# Patient Record
Sex: MALE | Race: WHITE | NOT HISPANIC OR LATINO | Employment: FULL TIME | URBAN - METROPOLITAN AREA
[De-identification: names, ages, dates, MRNs, and addresses within clinical notes are randomized per-mention and may not be internally consistent; named-entity substitution may affect disease eponyms.]

---

## 2017-01-05 ENCOUNTER — OFFICE VISIT (OUTPATIENT)
Dept: WOUND CARE | Facility: HOSPITAL | Age: 67
End: 2017-01-05
Attending: SURGERY
Payer: MEDICARE

## 2017-01-05 VITALS
SYSTOLIC BLOOD PRESSURE: 173 MMHG | RESPIRATION RATE: 20 BRPM | DIASTOLIC BLOOD PRESSURE: 84 MMHG | TEMPERATURE: 98 F | HEART RATE: 63 BPM

## 2017-01-05 DIAGNOSIS — L97.522 NEUROPATHIC ULCER OF TOE OF LEFT FOOT WITH FAT LAYER EXPOSED: ICD-10-CM

## 2017-01-05 PROCEDURE — 99212 OFFICE O/P EST SF 10 MIN: CPT | Mod: 25

## 2017-01-05 PROCEDURE — 11042 DBRDMT SUBQ TIS 1ST 20SQCM/<: CPT

## 2017-01-05 PROCEDURE — 27201912 HC WOUND CARE DEBRIDEMENT SUPPLIES

## 2017-01-05 NOTE — MR AVS SNAPSHOT
[facility] [address] [place] [address]  Phone: [phone]  Fax: [phone]                  Michael Plummer   2017 8:30 AM   Appointment    Description:  Male : 1950   Provider:  Fuentes Infante Jr., MD   Department:  [facility]                To Do List           Future Appointments        Provider Department Dept Phone    2017 2:20 PM COORDINATED DEVICE CHECK [address] - Arrhythmia [phone]      Goals (5 Years of Data)     None      [facility] [place] [facility]     [facility] -  Assistance  Registered nurses in the [facility] provide clinical advisement, health education, appointment booking, and other advisory services.  Call for this free service at [phone].             Medications           Message regarding Medications     Verify the changes and/or additions to your medication regime listed below are the same as discussed with your clinician today.  If any of these changes or additions are incorrect, please notify your healthcare provider.             Verify that the below list of medications is an accurate representation of the medications you are currently taking.  If none reported, the list may be blank. If incorrect, please contact your healthcare provider. Carry this list with you in case of emergency.           Current Medications     acetaminophen (TYLENOL) 500 MG tablet Take 500 mg by mouth 2 (two) times daily.      amlodipine (NORVASC) 5 MG tablet Take 5 mg by mouth once daily.      ascorbic acid (VITAMIN C) 500 MG tablet Take 500 mg by mouth once daily.     aspirin (ECOTRIN) 81 MG EC tablet Take 81 mg by mouth once daily.    atorvastatin (LIPITOR) 40 MG tablet Take 80 mg by mouth every evening.     bumetanide (BUMEX) 1 MG tablet Take 1 mg by mouth once daily.     cholecalciferol, vitamin D3, (VITAMIN D3) 1,000 unit capsule Take 1,000 Units by mouth once daily.      digoxin (LANOXIN) 125  mcg tablet Take 125 mcg by mouth once daily.      escitalopram (LEXAPRO) 10 MG tablet Take 10 mg by mouth once daily.    fluticasone (FLONASE) 50 mcg/actuation nasal spray 1 spray by Nasal route once daily.      GLUCOSAMINE HCL/CHONDR MONAE A NA (OSTEO BI-FLEX ORAL) Take 1 tablet by mouth 2 (two) times daily.      iron, carbonyl, (ICAR) 15 mg/1.25 mL Susp Take by mouth once daily. 45mg tablets     isosorbide mononitrate (IMDUR) 30 MG 24 hr tablet Take 30 mg by mouth once daily.    lisinopril (PRINIVIL,ZESTRIL) 20 MG tablet Take 20 mg by mouth once daily.      meloxicam (MOBIC) 15 MG tablet Take 15 mg by mouth once daily.      metoprolol succinate (TOPROL-XL) 50 MG 24 hr tablet Take 50 mg by mouth once daily.      multivitamin (MULTIVITAMIN) per tablet Take 1 tablet by mouth once daily.      niacin (SLO-NIACIN) 500 mg tablet     niacin 500 MG Tab Take 500 mg by mouth once daily. TWO TABS IN THE MORNING AND ONE TABLET AT NIGHT    nitroGLYCERIN (NITROSTAT) 0.4 MG SL tablet Place 1 tablet (0.4 mg total) under the tongue every 5 (five) minutes as needed for Chest pain.    omega-3 acid ethyl esters (LOVAZA) 1 gram capsule Take 2 g by mouth 2 (two) times daily.      oxycodone-acetaminophen  mg (PERCOCET)  mg per tablet     pantoprazole (PROTONIX) 40 MG tablet Take 40 mg by mouth once daily.    sotalol (BETAPACE) 80 MG tablet Take 1 tablet (80 mg total) by mouth 2 (two) times daily.    tamsulosin (FLOMAX) 0.4 mg Cp24     tolterodine (DETROL LA) 4 MG 24 hr capsule Take 4 mg by mouth once daily.    tramadol (ULTRAM) 50 mg tablet Take 50 mg by mouth every 6 (six) hours as needed.      valacyclovir (VALTREX) 1000 MG tablet Take 1,000 mg by mouth once daily.     VASCEPA 1 gram Cap            Clinical Reference Information           Allergies as of 1/5/2017     Adhesive    Bactrim [Sulfamethoxazole-trimethoprim]      Immunizations Administered on Date of Encounter - 1/5/2017     None

## 2017-01-11 ENCOUNTER — CLINICAL SUPPORT (OUTPATIENT)
Dept: ELECTROPHYSIOLOGY | Facility: CLINIC | Age: 67
End: 2017-01-11
Payer: MEDICARE

## 2017-01-11 DIAGNOSIS — I47.20 VT (VENTRICULAR TACHYCARDIA): ICD-10-CM

## 2017-01-11 DIAGNOSIS — Z95.810 AUTOMATIC IMPLANTABLE CARDIAC DEFIBRILLATOR IN SITU: ICD-10-CM

## 2017-01-11 PROCEDURE — 93284 PRGRMG EVAL IMPLANTABLE DFB: CPT | Mod: PBBFAC | Performed by: INTERNAL MEDICINE

## 2017-01-12 NOTE — PROGRESS NOTES
DFT/NIEPS EDUCATION CHECKLIST      PRE - PROCEDURE LABS HAVE BEEN ORDERED FOR YOU @   [facility] ([phone]) to schedule appointment on 3/17/17 to have labs drawn and results faxed to us at [phone].  BE SURE TO ARRIVE AT YOUR SCHEDULED TIME FOR THIS LAB WORK!  (YOU DO NOT HAVE TO FAST FOR THIS LABWORK!!!!)    3/23/17/17 @ 6 AM -  ARRIVAL TIME FOR PROCEDURE -  REPORT TO CARDIOLOGY WAITING ROOM ON 3RD FLOOR OF HOSPITAL (DO NOT REPORT TO CLINIC)  Directions for Reporting to Cardiology Waiting Area in the Hospital  If you park in the Parking Garage:  Take elevators to the 2nd floor  Walk up ramp and turn right by Gold Elevators  Take elevator to the 3rd floor  Upon exiting the elevator, turn away from the clinic areas  Walk long sarabia around to front of hospital to area with windows overlooking [facility]  Check in at Reception Desk  OR  If family is dropping you off:  Have them drop you off at the front of the Hospital  (Near the ER, where all the flags are hung).  Take the E elevators to the 3rd floor.  Check in at the Reception Desk in the waiting room.      DO NOT EAT OR DRINK ANYTHING AFTER: Midnight on 3/22/17    MEDICATIONS:  YOU MAY TAKE YOUR USUAL MORNING MEDICATIONS WITH A SIP OF WATER    YOU WILL BE GOING HOME AFTER YOUR PROCEDURE  YOU WILL NEED SOMEONE TO DRIVE YOU HOME AFTER YOUR PROCEDURE     YOUR PAIN DURING YOUR PROCEDURE WILL BE MANAGED BY THE ANESTHESIA TEAM    THE ABOVE INSTRUCTIONS WERE GIVEN TO THE PATIENT VERBALLY AND THEY VERBALIZED UNDERSTANDING. THEY DO NOT REQUIRE ANY SPECIAL NEEDS AND DO NOT HAVE ANY LEARNING BARRIERS.    Any need to reschedule or cancel procedures, or any questions regarding your procedures should be addressed directly with the Arrhythmia Department Nurses at the following phone number: [phone]

## 2017-01-19 ENCOUNTER — OFFICE VISIT (OUTPATIENT)
Dept: WOUND CARE | Facility: HOSPITAL | Age: 67
End: 2017-01-19
Attending: SURGERY
Payer: MEDICARE

## 2017-01-19 DIAGNOSIS — L97.522 NEUROPATHIC ULCER OF TOE OF LEFT FOOT WITH FAT LAYER EXPOSED: Primary | ICD-10-CM

## 2017-01-26 ENCOUNTER — TELEPHONE (OUTPATIENT)
Dept: ELECTROPHYSIOLOGY | Facility: CLINIC | Age: 67
End: 2017-01-26

## 2017-01-26 NOTE — TELEPHONE ENCOUNTER
Called patient to confirm EP Lab Procedure on 1/31/17 at 1015 arrival. Discussed pre op instructions, including: arrival time, NPO past midnight and medication instructions. No medications were to be held. Patient verbalized understanding.

## 2017-01-30 ENCOUNTER — TELEPHONE (OUTPATIENT)
Dept: ELECTROPHYSIOLOGY | Facility: CLINIC | Age: 67
End: 2017-01-30

## 2017-01-30 DIAGNOSIS — I42.9 CARDIOMYOPATHY, UNSPECIFIED TYPE: Primary | ICD-10-CM

## 2017-01-30 DIAGNOSIS — I42.9 CARDIOMYOPATHY: ICD-10-CM

## 2017-01-30 RX ORDER — SODIUM CHLORIDE 9 MG/ML
INJECTION, SOLUTION INTRAVENOUS ONCE
Status: CANCELLED | OUTPATIENT
Start: 2017-01-30 | End: 2017-01-30

## 2017-01-30 NOTE — TELEPHONE ENCOUNTER
Nurse called patient back. Mr Plummer informed nurse he as been ill overt he weekend and wants to cancel tomorrow's procedure and reschedule. Nurse agreed and will call back later this week to reschedule. Patient acknowledged.    ----- Message from Mali Alexander MA sent at 1/30/2017  8:24 AM CST -----  Contact: patient called  Please call the patient at home he need to cancel  His apt on 1-. Thank you.

## 2017-03-13 ENCOUNTER — TELEPHONE (OUTPATIENT)
Dept: ELECTROPHYSIOLOGY | Facility: CLINIC | Age: 67
End: 2017-03-13

## 2017-03-13 ENCOUNTER — PATIENT MESSAGE (OUTPATIENT)
Dept: ELECTROPHYSIOLOGY | Facility: CLINIC | Age: 67
End: 2017-03-13

## 2017-03-13 NOTE — TELEPHONE ENCOUNTER
Nurse called and spoke with patient scheduled procedure for 3/23 at 6am arrival with labs at [facility] on 3/17/17. Patient acknowledged.  ----- Message from Shruti Nails RN sent at 3/8/2017  2:15 PM CST -----  Contact: pt called      ----- Message -----     From: Sneha Johnson     Sent: 3/8/2017   1:56 PM       [facility]    Pt called, requesting to schedule an upcoming appointment. [phone]. Thank you

## 2017-03-13 NOTE — TELEPHONE ENCOUNTER
----- Message from Paddy Josue MD sent at 3/13/2017  7:30 AM CDT -----  Yes that works. [facility] will need to be there (he has a [facility] device).    GP  ----- Message -----     From: Peng Richardson RN     Sent: 3/10/2017   2:58 PM       To: Paddy Josue MD    Doc   Am trying to schedule the dft again for this patient. How long will this take? Can it be done one morning while they getting a pvi ready?

## 2017-03-16 ENCOUNTER — TELEPHONE (OUTPATIENT)
Dept: ELECTROPHYSIOLOGY | Facility: CLINIC | Age: 67
End: 2017-03-16

## 2017-03-16 NOTE — TELEPHONE ENCOUNTER
Called patient to confirm EP Lab Procedure on 3/23/17 arrival time 6am. Discussed pre op instructions, including: arrival time, NPO past midnight and medication instructions. Patient verbalized understanding.

## 2017-03-22 ENCOUNTER — ANESTHESIA EVENT (OUTPATIENT)
Dept: MEDSURG UNIT | Facility: HOSPITAL | Age: 67
End: 2017-03-22
Payer: MEDICARE

## 2017-03-23 ENCOUNTER — HOSPITAL ENCOUNTER (OUTPATIENT)
Facility: HOSPITAL | Age: 67
Discharge: HOME | End: 2017-03-23
Attending: INTERNAL MEDICINE | Admitting: INTERNAL MEDICINE
Payer: MEDICARE

## 2017-03-23 ENCOUNTER — ANESTHESIA (OUTPATIENT)
Dept: MEDSURG UNIT | Facility: HOSPITAL | Age: 67
End: 2017-03-23
Payer: MEDICARE

## 2017-03-23 VITALS
RESPIRATION RATE: 18 BRPM | DIASTOLIC BLOOD PRESSURE: 84 MMHG | HEART RATE: 60 BPM | OXYGEN SATURATION: 98 % | SYSTOLIC BLOOD PRESSURE: 161 MMHG | WEIGHT: 315 LBS | HEIGHT: 75 IN | TEMPERATURE: 98 F | BODY MASS INDEX: 39.17 KG/M2

## 2017-03-23 DIAGNOSIS — I42.9 CARDIOMYOPATHY: ICD-10-CM

## 2017-03-23 PROCEDURE — D9220A PRA ANESTHESIA: Mod: CRNA,,, | Performed by: NURSE ANESTHETIST, CERTIFIED REGISTERED

## 2017-03-23 PROCEDURE — D9220A PRA ANESTHESIA: Mod: ANES,,, | Performed by: ANESTHESIOLOGY

## 2017-03-23 PROCEDURE — 93642 EP EVL 1/2CHMB TRNSVNS CVDFB: CPT | Mod: 26,,, | Performed by: INTERNAL MEDICINE

## 2017-03-23 PROCEDURE — 25000003 PHARM REV CODE 250: Performed by: NURSE ANESTHETIST, CERTIFIED REGISTERED

## 2017-03-23 PROCEDURE — 63600175 PHARM REV CODE 636 W HCPCS: Performed by: NURSE ANESTHETIST, CERTIFIED REGISTERED

## 2017-03-23 PROCEDURE — 93005 ELECTROCARDIOGRAM TRACING: CPT

## 2017-03-23 PROCEDURE — 37000008 HC ANESTHESIA 1ST 15 MINUTES: Performed by: INTERNAL MEDICINE

## 2017-03-23 PROCEDURE — 93010 ELECTROCARDIOGRAM REPORT: CPT | Mod: ,,, | Performed by: INTERNAL MEDICINE

## 2017-03-23 PROCEDURE — 27100006 EP LAB PROCEDURE

## 2017-03-23 PROCEDURE — 37000009 HC ANESTHESIA EA ADD 15 MINS: Performed by: INTERNAL MEDICINE

## 2017-03-23 RX ORDER — PROPOFOL 10 MG/ML
VIAL (ML) INTRAVENOUS
Status: DISCONTINUED | OUTPATIENT
Start: 2017-03-23 | End: 2017-03-23

## 2017-03-23 RX ORDER — SODIUM CHLORIDE 9 MG/ML
INJECTION, SOLUTION INTRAVENOUS CONTINUOUS PRN
Status: DISCONTINUED | OUTPATIENT
Start: 2017-03-23 | End: 2017-03-23

## 2017-03-23 RX ORDER — LIDOCAINE HCL/PF 100 MG/5ML
SYRINGE (ML) INTRAVENOUS
Status: DISCONTINUED | OUTPATIENT
Start: 2017-03-23 | End: 2017-03-23

## 2017-03-23 RX ORDER — MIDAZOLAM HYDROCHLORIDE 1 MG/ML
INJECTION, SOLUTION INTRAMUSCULAR; INTRAVENOUS
Status: DISCONTINUED | OUTPATIENT
Start: 2017-03-23 | End: 2017-03-23

## 2017-03-23 RX ORDER — DEXMEDETOMIDINE HYDROCHLORIDE 100 UG/ML
INJECTION, SOLUTION INTRAVENOUS
Status: DISCONTINUED | OUTPATIENT
Start: 2017-03-23 | End: 2017-03-23

## 2017-03-23 RX ADMIN — LIDOCAINE HYDROCHLORIDE 20 MG: 20 INJECTION, SOLUTION INTRAVENOUS at 07:03

## 2017-03-23 RX ADMIN — SODIUM CHLORIDE: 0.9 INJECTION, SOLUTION INTRAVENOUS at 07:03

## 2017-03-23 RX ADMIN — MIDAZOLAM HYDROCHLORIDE 2 MG: 1 INJECTION INTRAMUSCULAR; INTRAVENOUS at 07:03

## 2017-03-23 RX ADMIN — DEXMEDETOMIDINE HYDROCHLORIDE 12 MCG: 100 INJECTION, SOLUTION, CONCENTRATE INTRAVENOUS at 07:03

## 2017-03-23 RX ADMIN — PROPOFOL 20 MG: 10 INJECTION, EMULSION INTRAVENOUS at 07:03

## 2017-03-23 NOTE — ANESTHESIA RELEASE NOTE
"Anesthesia Release from PACU Note    Patient: Michael Plummer    Procedure(s) Performed: Procedure(s) (LRB):  NONINVASIVE ELECTROPHYSIOLOGY TESTING (NIEPS) (N/A)    Anesthesia type: GEN    Post pain: Adequate analgesia reported    Post assessment: no apparent anesthetic complications, tolerated procedure well and no evidence of recall    Post vital signs: BP (!) 114/58 (BP Location: Right arm, Patient Position: Lying, BP Method: Automatic)  Pulse 60  Temp 36.4 °C (97.6 °F) (Temporal)   Resp 18  Ht 6' 3" (1.905 m)  Wt (!) 172.4 kg (380 lb)  SpO2 99%  BMI 47.5 kg/m2    Level of consciousness: awake, alert and oriented    Nausea/Vomiting: no nausea/no vomiting    Complications: none    Airway Patency: patent    Respiratory: unassisted, spontaneous ventilation, room air    Cardiovascular: stable and blood pressure at baseline    Hydration: euvolemic    "

## 2017-03-23 NOTE — DISCHARGE SUMMARY
[facility]  Cardiology  Discharge Summary      Patient Name: Michael Plummer  MRN: [id number]  Admission Date: 3/23/2017  Hospital Length of Stay: 0 days  Discharge Date and Time: 3/23/2017 10:36 AM  Attending Physician: Paddy Josue MD  Discharging Provider: Wie Rosales MD  Primary Care Physician: Keyon Edge MD    HPI: 67 y/o male with  Kaity XT TAVR (Valve-in-Valve),  cardiomyopathy ischemic/valvular, ventricular tachycardia, VT ablation, and CRT-D device in place was admitted for an elective NIEPS study.    Procedure(s) (LRB):  NONINVASIVE ELECTROPHYSIOLOGY TESTING (NIEPS) (N/A)     Hospital Course : Patient tolerated the study well. Status post successful DFT testing, with only one dropped beat at minimum sensitivity    Consults: anaesthesia    Final Active Diagnoses:    Diagnosis Date Noted POA    PRINCIPAL PROBLEM:  Cardiomyopathy [I42.9] 03/23/2017 Yes      Problems Resolved During this Admission:    Diagnosis Date Noted Date Resolved POA       Discharged Condition: good    Follow Up:    Follow-up Information     Follow up with Paddy Josue MD In 3 months.    Specialties:  Electrophysiology, Cardiovascular Disease    Contact information:    [address] [place] [address] [id number] [phone]          Patient Instructions:     Diet Diabetic 1800 Calories     Activity as tolerated     Lifting restrictions       Medications:  Reconciled Home Medications:   Discharge Medication List as of 3/23/2017 10:12 AM      CONTINUE these medications which have NOT CHANGED    Details   acetaminophen (TYLENOL) 500 MG tablet Take 500 mg by mouth 2 (two) times daily.  , Until Discontinued, Historical Med      amlodipine (NORVASC) 5 MG tablet Take 5 mg by mouth once daily.  , Until Discontinued, Historical Med      ascorbic acid (VITAMIN C) 500 MG tablet Take 500 mg by mouth once daily. , Until Discontinued, Historical Med      aspirin (ECOTRIN) 81 MG EC tablet Take 81 mg by mouth  once daily., Until Discontinued, Historical Med      atorvastatin (LIPITOR) 40 MG tablet Take 80 mg by mouth every evening. , Until Discontinued, Historical Med      bumetanide (BUMEX) 1 MG tablet Take 1 mg by mouth once daily. , Starting 8/16/2013, Until Discontinued, Historical Med      cholecalciferol, vitamin D3, (VITAMIN D3) 1,000 unit capsule Take 1,000 Units by mouth once daily.  , Until Discontinued, Historical Med      digoxin (LANOXIN) 125 mcg tablet Take 125 mcg by mouth once daily.  , Until Discontinued, Historical Med      escitalopram (LEXAPRO) 10 MG tablet Take 10 mg by mouth once daily., Until Discontinued, Historical Med      fluticasone (FLONASE) 50 mcg/actuation nasal spray 1 spray by Nasal route once daily.  , Until Discontinued, Historical Med      GLUCOSAMINE HCL/CHONDR MONAE A NA (OSTEO BI-FLEX ORAL) Take 1 tablet by mouth 2 (two) times daily.  , Until Discontinued, Historical Med      iron, carbonyl, (ICAR) 15 mg/1.25 mL Susp Take by mouth once daily. 45mg tablets , Until Discontinued, Historical Med      isosorbide mononitrate (IMDUR) 30 MG 24 hr tablet Take 30 mg by mouth once daily., Until Discontinued, Historical Med      lisinopril (PRINIVIL,ZESTRIL) 20 MG tablet Take 20 mg by mouth once daily.  , Until Discontinued, Historical Med      meloxicam (MOBIC) 15 MG tablet Take 15 mg by mouth once daily.  , Until Discontinued, Historical Med      metoprolol succinate (TOPROL-XL) 50 MG 24 hr tablet Take 50 mg by mouth once daily.  , Until Discontinued, Historical Med      multivitamin (MULTIVITAMIN) per tablet Take 1 tablet by mouth once daily.  , Until Discontinued, Historical Med      niacin (SLO-NIACIN) 500 mg tablet Starting 6/16/2015, Until Discontinued, Historical Med      niacin 500 MG Tab Take 500 mg by mouth once daily. TWO TABS IN THE MORNING AND ONE TABLET AT NIGHT, Until Discontinued, Historical Med      nitroGLYCERIN (NITROSTAT) 0.4 MG SL tablet Place 1 tablet (0.4 mg total) under the  tongue every 5 (five) minutes as needed for Chest pain., Starting 7/31/2013, Until Thu 7/31/14, Print      omega-3 acid ethyl esters (LOVAZA) 1 gram capsule Take 2 g by mouth 2 (two) times daily.  , Until Discontinued, Historical Med      oxycodone-acetaminophen  mg (PERCOCET)  mg per tablet Starting 8/12/2013, Until Discontinued, Historical Med      pantoprazole (PROTONIX) 40 MG tablet Take 40 mg by mouth once daily., Until Discontinued, Historical Med      sotalol (BETAPACE) 80 MG tablet Take 1 tablet (80 mg total) by mouth 2 (two) times daily., Starting 7/19/2016, Until Wed 7/19/17, Normal      tamsulosin (FLOMAX) 0.4 mg Cp24 Starting 5/15/2015, Until Discontinued, Historical Med      tolterodine (DETROL LA) 4 MG 24 hr capsule Take 4 mg by mouth once daily., Until Discontinued, Historical Med      tramadol (ULTRAM) 50 mg tablet Take 50 mg by mouth every 6 (six) hours as needed.  , Until Discontinued, Historical Med      valacyclovir (VALTREX) 1000 MG tablet Take 1,000 mg by mouth once daily. , Until Discontinued, Historical Med      VASCEPA 1 gram Cap Starting 6/14/2015, Until Discontinued, Historical Med           Wei Rosales MD  Cardiology  [facility]

## 2017-03-23 NOTE — PROGRESS NOTES
65 y/o male with  Kaity XT TAVR (Valve-in-Valve),  cardiomyopathy ischemic/valvular, ventricular tachycardia, VT ablation, and to CRT-D device Admitted for DFT/ NIEPS testing.   Sedation per anesthesia.  DFT testing done. Programming changes made. Details per procedure note.  No change in medications. He will be monitored for an hour or so in the recovery area.  Will be discharged home and advised follow up in 3 months with Dr MILENA Josue in EP clinic.

## 2017-03-23 NOTE — NURSING TRANSFER
Nursing Transfer Note      3/23/2017     Transfer To: Room #3 [facility]    Transfer via stretcher    Transfer with cardiac monitoring    Transported by RN    Medicines sent: NA    Chart send with patient: Yes    Notified: family at bedside    Patient reassessed at: 3/22/17 0920    Upon arrival to floor: call bell in reach    Report called to MURRAY Morley

## 2017-03-23 NOTE — PLAN OF CARE
Received report from MURRAY Gong. Pt received from [facility] via stretcher. Patient  AAOx3. VSS, no distress noted. Resp even and unlabored. No hematoma noted. Post procedure protocol reviewed with patient and patient's family. Understanding verbalized. Family members at bedside. Nurse call bell within reach. Will continue to monitor per post procedure protocol.

## 2017-03-23 NOTE — PLAN OF CARE
Pt verbalized an understanding of discharge instructions including diet, s/s to notify md, post procedure care, and follow up. Pt left unit via own scooter with daughter and fiance.

## 2017-03-23 NOTE — IP AVS SNAPSHOT
[facility]  [address]  [facility] [address]  Phone: [phone]           Patient Discharge Instructions     Our goal is to set you up for success. This packet includes information on your condition, medications, and your home care. It will help you to care for yourself so you don't get sicker and need to go back to the hospital.     Please ask your nurse if you have any questions.        There are many details to remember when preparing to leave the hospital. Here is what you will need to do:    1. Take your medicine. If you are prescribed medications, review your Medication List in the following pages. You may have new medications to  at the pharmacy and others that you'll need to stop taking. Review the instructions for how and when to take your medications. Talk with your doctor or nurses if you are unsure of what to do.     2. Go to your follow-up appointments. Specific follow-up information is listed in the following pages. Your may be contacted by a transition nurse or clinical provider about future appointments. Be sure we have all of the phone numbers to reach you, if needed. Please contact your provider's office if you are unable to make an appointment.     3. Watch for warning signs. Your doctor or nurse will give you detailed warning signs to watch for and when to call for assistance. These instructions may also include educational information about your condition. If you experience any of warning signs to your health, call your doctor.               [facility]  Unless otherwise directed by your provider, please contact [facility], our nurse care line that is available for 24/7 assistance.     [phone] (toll-free)    Registered nurses in the [facility] provide clinical advisement, health education, appointment booking, and other advisory services.                    ** Verify the list of medication(s) below is accurate and up  to date. Carry this with you in case of emergency. If your medications have changed, please notify your healthcare provider.             Medication List      CONTINUE taking these medications        Additional Info                      acetaminophen 500 MG tablet   Commonly known as:  TYLENOL   Refills:  0   Dose:  500 mg    Instructions:  Take 500 mg by mouth 2 (two) times daily.     Begin Date    AM    Noon    PM    Bedtime       amlodipine 5 MG tablet   Commonly known as:  NORVASC   Refills:  0   Dose:  5 mg    Instructions:  Take 5 mg by mouth once daily.     Begin Date    AM    Noon    PM    Bedtime       aspirin 81 MG EC tablet   Commonly known as:  ECOTRIN   Refills:  0   Dose:  81 mg    Instructions:  Take 81 mg by mouth once daily.     Begin Date    AM    Noon    PM    Bedtime       atorvastatin 40 MG tablet   Commonly known as:  LIPITOR   Refills:  0   Dose:  80 mg    Instructions:  Take 80 mg by mouth every evening.     Begin Date    AM    Noon    PM    Bedtime       bumetanide 1 MG tablet   Commonly known as:  BUMEX   Refills:  0   Dose:  1 mg    Instructions:  Take 1 mg by mouth once daily.     Begin Date    AM    Noon    PM    Bedtime       digoxin 125 mcg tablet   Commonly known as:  LANOXIN   Refills:  0   Dose:  125 mcg    Instructions:  Take 125 mcg by mouth once daily.     Begin Date    AM    Noon    PM    Bedtime       escitalopram oxalate 10 MG tablet   Commonly known as:  LEXAPRO   Refills:  0   Dose:  10 mg    Instructions:  Take 10 mg by mouth once daily.     Begin Date    AM    Noon    PM    Bedtime       fluticasone 50 mcg/actuation nasal spray   Commonly known as:  FLONASE   Refills:  0   Dose:  1 spray    Instructions:  1 spray by Nasal route once daily.     Begin Date    AM    Noon    PM    Bedtime       iron (carbonyl) 15 mg/1.25 mL Susp   Commonly known as:  ICAR   Refills:  0    Instructions:  Take by mouth once daily. 45mg tablets     Begin Date    AM    Noon    PM    Bedtime        isosorbide mononitrate 30 MG 24 hr tablet   Commonly known as:  IMDUR   Refills:  0   Dose:  30 mg    Instructions:  Take 30 mg by mouth once daily.     Begin Date    AM    Noon    PM    Bedtime       lisinopril 20 MG tablet   Commonly known as:  PRINIVIL,ZESTRIL   Refills:  0   Dose:  20 mg    Instructions:  Take 20 mg by mouth once daily.     Begin Date    AM    Noon    PM    Bedtime       meloxicam 15 MG tablet   Commonly known as:  MOBIC   Refills:  0   Dose:  15 mg    Instructions:  Take 15 mg by mouth once daily.     Begin Date    AM    Noon    PM    Bedtime       metoprolol succinate 50 MG 24 hr tablet   Commonly known as:  TOPROL-XL   Refills:  0   Dose:  50 mg    Instructions:  Take 50 mg by mouth once daily.     Begin Date    AM    Noon    PM    Bedtime       * niacin 500 MG Tab   Refills:  0   Dose:  500 mg    Instructions:  Take 500 mg by mouth once daily. TWO TABS IN THE MORNING AND ONE TABLET AT NIGHT     Begin Date    AM    Noon    PM    Bedtime       * niacin 500 mg tablet   Commonly known as:  SLO-NIACIN   Refills:  1      Begin Date    AM    Noon    PM    Bedtime       nitroGLYCERIN 0.4 MG SL tablet   Commonly known as:  NITROSTAT   Quantity:  30 tablet   Refills:  3   Dose:  0.4 mg    Instructions:  Place 1 tablet (0.4 mg total) under the tongue every 5 (five) minutes as needed for Chest pain.     Begin Date    AM    Noon    PM    Bedtime       omega-3 acid ethyl esters 1 gram capsule   Commonly known as:  LOVAZA   Refills:  0   Dose:  2 g    Instructions:  Take 2 g by mouth 2 (two) times daily.     Begin Date    AM    Noon    PM    Bedtime       ONE DAILY MULTIVITAMIN per tablet   Refills:  0   Dose:  1 tablet   Generic drug:  multivitamin    Instructions:  Take 1 tablet by mouth once daily.     Begin Date    AM    Noon    PM    Bedtime       OSTEO BI-FLEX ORAL   Refills:  0   Dose:  1 tablet    Instructions:  Take 1 tablet by mouth 2 (two) times daily.     Begin Date    AM    Noon    PM     Bedtime       oxycodone-acetaminophen  mg per tablet   Commonly known as:  PERCOCET   Refills:  0      Begin Date    AM    Noon    PM    Bedtime       pantoprazole 40 MG tablet   Commonly known as:  PROTONIX   Refills:  0   Dose:  40 mg    Instructions:  Take 40 mg by mouth once daily.     Begin Date    AM    Noon    PM    Bedtime       sotalol 80 MG tablet   Commonly known as:  BETAPACE   Quantity:  180 tablet   Refills:  3   Dose:  80 mg    Instructions:  Take 1 tablet (80 mg total) by mouth 2 (two) times daily.     Begin Date    AM    Noon    PM    Bedtime       tamsulosin 0.4 mg Cp24   Commonly known as:  FLOMAX   Refills:  3      Begin Date    AM    Noon    PM    Bedtime       tolterodine 4 MG 24 hr capsule   Commonly known as:  DETROL LA   Refills:  0   Dose:  4 mg    Instructions:  Take 4 mg by mouth once daily.     Begin Date    AM    Noon    PM    Bedtime       tramadol 50 mg tablet   Commonly known as:  ULTRAM   Refills:  0   Dose:  50 mg    Instructions:  Take 50 mg by mouth every 6 (six) hours as needed.     Begin Date    AM    Noon    PM    Bedtime       valacyclovir 1000 MG tablet   Commonly known as:  VALTREX   Refills:  0   Dose:  1000 mg    Instructions:  Take 1,000 mg by mouth once daily.     Begin Date    AM    Noon    PM    Bedtime       VASCEPA 1 gram Cap   Refills:  0   Generic drug:  icosapent ethyl      Begin Date    AM    Noon    PM    Bedtime       VITAMIN C 500 MG tablet   Refills:  0   Dose:  500 mg   Generic drug:  ascorbic acid (vitamin C)    Instructions:  Take 500 mg by mouth once daily.     Begin Date    AM    Noon    PM    Bedtime       VITAMIN D3 1,000 unit capsule   Refills:  0   Dose:  1000 Units   Generic drug:  cholecalciferol (vitamin D3)    Instructions:  Take 1,000 Units by mouth once daily.     Begin Date    AM    Noon    PM    Bedtime       * Notice:  This list has 2 medication(s) that are the same as other medications prescribed for you. Read the directions  "carefully, and ask your doctor or other care provider to review them with you.               Please bring to all follow up appointments:    1. A copy of your discharge instructions.  2. All medicines you are currently taking in their original bottles.  3. Identification and insurance card.    Please arrive 15 minutes ahead of scheduled appointment time.    Please call 24 hours in advance if you must reschedule your appointment and/or time.        Your Scheduled Appointments     Apr 13, 2017  8:00 AM CDT   Remote Interrogation with HOME MONITOR DEVICE CHECK, [facility]   [facility] - Arrhythmia ([address] )    [address]  [facility] [address]   [phone]              Follow-up Information     Follow up with Paddy Josue MD In 3 months.    Specialties:  Electrophysiology, Cardiovascular Disease    Contact information:    [address] [id number] [phone]          Discharge Instructions     Future Orders    Activity as tolerated     Diet Diabetic 1800 Calories     Lifting restrictions         Primary Diagnosis     Your primary diagnosis was:  Heart Muscle Disorder      Admission Information     Date & Time Provider Department CSN    3/23/2017  6:15 AM Paddy Josue MD [facility] [id number]      Care Providers     Provider Role Specialty Primary office phone    Paddy Josue MD Attending Provider Electrophysiology [phone]    Paddy Josue MD Surgeon  Electrophysiology [phone]      Your Vitals Were     BP Pulse Temp Resp Height Weight    161/84 (BP Location: Left arm, Patient Position: Lying, BP Method: Automatic) 60 97.5 °F (36.4 °C) (Axillary) 18 6' 3" (1.905 m) 172.4 kg (380 lb)    SpO2 BMI             98% 47.5 kg/m2         Recent Lab Values        9/13/2012 9/16/2012                        2:44 AM  2:15 AM          A1C 5.2 5.3                     Allergies as of 3/23/2017        Reactions    Adhesive Rash    Bactrim [Sulfamethoxazole-trimethoprim]  "    hives      Advance Directives     An advance directive is a document which, in the event you are no longer able to make decisions for yourself, tells your healthcare team what kind of treatment you do or do not want to receive, or who you would like to make those decisions for you.  If you do not currently have an advance directive, [facility] encourages you to create one.  For more information call:  [phone]), [phone] ([phone]),  or log on to [email or web address].        Language Assistance Services     ATTENTION: Language assistance services are available, free of charge. Please call [phone].      ATENCIÓN: Si faviola sharri, tiene a grimm disposición servicios gratuitos de asistencia lingüística. Llame al [phone].     CHÚ Ý: N?u b?n nói Ti?ng Vi?t, có các d?ch v? h? tr? ngôn ng? mi?n phí dành cho b?n. G?i s? [phone].         [facility] complies with applicable Federal civil rights laws and does not discriminate on the basis of race, color, national origin, age, disability, or sex.

## 2017-03-23 NOTE — PROGRESS NOTES
Patient transferred to [facility], AAOx4, VSS. No complaints from patient. IV intact, saline locked.

## 2017-03-23 NOTE — H&P
[facility]  Cardiology Electrophysiology  History and Physical     Patient Name: Michael Plummer  MRN: [id number]  Admission Date: 3/23/2017  Attending Provider: Paddy Josue MD  Principal Problem: Cardiomyopathy    Patient information was obtained from patient    Subjective:     Chief Complaint:      HPI: 67 y/o  male with a history of mixed cardiomyopathy (ischemic and valvular per outside records), CAD, aortic stenosis status-post AVR in 2005, and dual chamber Medtronic ICD in situ (placed by Dr. Trimble in [place], generator changed last year), who suffered traumatic syncope in 9/2012. His device interrogation revealed VT (monomorphic, cycle length 250-290ms) which was not terminated with ATP. An initial 35J shock did not terminate VT. A second 35J shock restored sinus rhythm. Prodromal symptoms present prior to this event included dyspnea, palpitations and dizziness. He was admitted to [facility] [place] for a subdural hematoma and subarachnoid hemorrhage. While in the hospital, he was seen in consultation by the electrophysiology service, at which time he was re-loaded with Amiodarone and his daily dose increased to 400 mg daily (he had been on a lower dose of Amiodarone since 2005). The patient had no further episodes of VT while in-house. In October 2012, Mr. Plummer had another episode of VT -440 ms with similar morphology to the original VT which was successfully treated with a 35J shock. He experienced palpitations and lightheadness prior to this episode and was able to sit down to prevent injury.      Unfortunately, Mr. Plummer developed worsening PFTs on Amiodarone and it was discontinued. On 7/25/2013, Mr. Plummer underwent a VT ablation at [facility] [place], [place]. Post-procedure, he was started on Tikosyn 500 ug BID. Lexapro was stopped, and Prozac initiated. Prozac was eventually stopped due to symptoms of fatigue, dry mouth, and insomnia.      In late 2013, Mr. Plummer underwent TAVR. After experiencing several  "additional episodes of VT, an upgrade to a biventricular ICD was performed. Tikosyn was stopped at that time and Sotalol initiated.       I reviewed today's ECG tracing, which shows  biventricular pacing.       Past Medical History:   Diagnosis Date    Anticoagulant long-term use     Arthritis     Blood transfusion     Cardiomyopathy     CHF (congestive heart failure)     COPD (chronic obstructive pulmonary disease)     Coronary artery disease     History of amiodarone therapy     HIT (heparin-induced thrombocytopenia)     Hyperlipidemia     Hypertension     Stenosis     s/p AVR    Transfusion reaction     "had a bad red rash and blood pressure dropped"    Ventricular tachycardia        Past Surgical History:   Procedure Laterality Date    CARDIAC CATHETERIZATION      CARDIAC VALVE SURGERY      FRACTURE SURGERY      JOINT REPLACEMENT      VASCULAR SURGERY            Review of patient's allergies indicates:   Allergen Reactions    Adhesive Rash    Bactrim [sulfamethoxazole-trimethoprim]      hives        No current facility-administered medications on file prior to encounter.      Current Outpatient Prescriptions on File Prior to Encounter   Medication Sig Dispense Refill    amlodipine (NORVASC) 5 MG tablet Take 5 mg by mouth once daily.        ascorbic acid (VITAMIN C) 500 MG tablet Take 500 mg by mouth once daily.       aspirin (ECOTRIN) 81 MG EC tablet Take 81 mg by mouth once daily.      atorvastatin (LIPITOR) 40 MG tablet Take 80 mg by mouth every evening.       bumetanide (BUMEX) 1 MG tablet Take 1 mg by mouth once daily.       cholecalciferol, vitamin D3, (VITAMIN D3) 1,000 unit capsule Take 1,000 Units by mouth once daily.        digoxin (LANOXIN) 125 mcg tablet Take 125 mcg by mouth once daily.        escitalopram (LEXAPRO) 10 MG tablet Take 10 mg by mouth once daily.      fluticasone (FLONASE) 50 mcg/actuation nasal spray 1 spray by Nasal route once daily.        GLUCOSAMINE " HCL/CHONDR MONAE A NA (OSTEO BI-FLEX ORAL) Take 1 tablet by mouth 2 (two) times daily.        iron, carbonyl, (ICAR) 15 mg/1.25 mL Susp Take by mouth once daily. 45mg tablets       isosorbide mononitrate (IMDUR) 30 MG 24 hr tablet Take 30 mg by mouth once daily.      lisinopril (PRINIVIL,ZESTRIL) 20 MG tablet Take 20 mg by mouth once daily.        meloxicam (MOBIC) 15 MG tablet Take 15 mg by mouth once daily.        metoprolol succinate (TOPROL-XL) 50 MG 24 hr tablet Take 50 mg by mouth once daily.        multivitamin (MULTIVITAMIN) per tablet Take 1 tablet by mouth once daily.        niacin (SLO-NIACIN) 500 mg tablet   1    omega-3 acid ethyl esters (LOVAZA) 1 gram capsule Take 2 g by mouth 2 (two) times daily.        oxycodone-acetaminophen  mg (PERCOCET)  mg per tablet       pantoprazole (PROTONIX) 40 MG tablet Take 40 mg by mouth once daily.      sotalol (BETAPACE) 80 MG tablet Take 1 tablet (80 mg total) by mouth 2 (two) times daily. 180 tablet 3    tamsulosin (FLOMAX) 0.4 mg Cp24   3    tolterodine (DETROL LA) 4 MG 24 hr capsule Take 4 mg by mouth once daily.      valacyclovir (VALTREX) 1000 MG tablet Take 1,000 mg by mouth once daily.       VASCEPA 1 gram Cap       acetaminophen (TYLENOL) 500 MG tablet Take 500 mg by mouth 2 (two) times daily.        niacin 500 MG Tab Take 500 mg by mouth once daily. TWO TABS IN THE MORNING AND ONE TABLET AT NIGHT      nitroGLYCERIN (NITROSTAT) 0.4 MG SL tablet Place 1 tablet (0.4 mg total) under the tongue every 5 (five) minutes as needed for Chest pain. 30 tablet 3    tramadol (ULTRAM) 50 mg tablet Take 50 mg by mouth every 6 (six) hours as needed.           Family History   Problem Relation Age of Onset    Heart disease Mother     No Known Problems Father     Clotting disorder Sister     No Known Problems Brother     No Known Problems Maternal Grandmother     No Known Problems Maternal Grandfather     No Known Problems Paternal  Grandmother     No Known Problems Paternal Grandfather     No Known Problems Maternal Aunt     No Known Problems Maternal Uncle     No Known Problems Paternal Aunt     No Known Problems Paternal Uncle     Anemia Neg Hx     Arrhythmia Neg Hx     Asthma Neg Hx     Fainting Neg Hx     Heart attack Neg Hx     Heart failure Neg Hx     Hyperlipidemia Neg Hx     Hypertension Neg Hx        Social History   Substance Use Topics    Smoking status: Never Smoker    Smokeless tobacco: Not on file    Alcohol use No       Review of Systems   Constitution: Positive for weakness and malaise/fatigue.   HENT: Negative for ear pain and tinnitus.   Eyes: Negative for blurred vision.   Cardiovascular: negative for chest pain, Negative for near-syncope, palpitations and syncope.   Respiratory:  Positive  for shortness of breath on exertion   Hematologic/Lymphatic: Negative for bruise/bleed easily.   Musculoskeletal: Negative for joint pain and muscle weakness.   Gastrointestinal:  Negative for abdominal pain and change in bowel habit.   Genitourinary: Negative for frequency.   Neurological:  Negative for dizziness.   Psychiatric/Behavioral:  Negative for depression, anxiety       Objective:     Temp: 97.6 °F (36.4 °C) (03/23/17 0645)  Pulse: 60 (03/23/17 0645)  Resp: 16 (03/23/17 0645)  BP: 135/73 (03/23/17 0646)  SpO2: 99 % (03/23/17 0645)    Vital Signs (24h Range):  Temp:  [97.6 °F (36.4 °C)]   Pulse:  [60]   Resp:  [16]   BP: (135)/(72-73)   SpO2:  [99 %]       PE:   General: Well developed, well nourished. No distress on Room air . Obese   Lungs: Clear to auscultation bilaterally.  No wheezing. Normal respiratory effort.  Cardiovascular:  S1 S2 Normal rate, regular rhythm and normal heart sounds.    PMI is not displaced  Extremities: No LE edema.  Abdomen: Abdomen is soft, non-tender non-distended with normal bowel sounds.  Skin: Skin color, texture, turgor normal. No rashes.  Musculoskeletal: normal range of motion    Neurologic: Normal strength and tone. No focal numbness or weakness.   Psychiatric: normal mood and affect.  behavior is normal. Alert and oriented X 3.      Significant Labs:   Lab Results   Component Value Date    WBC 6.85 2014    HGB 12.8 (L) 2014    HCT 38.7 (L) 2014    MCV 98 2014     (L) 2014     BMP  Lab Results   Component Value Date     2015    K 4.9 2015     2015    CO2 26 2015    BUN 28 (H) 2015    CREATININE 1.4 2015    CALCIUM 9.6 2015    ANIONGAP 8 2015    ESTGFRAFRICA >60.0 2015    EGFRNONAA 52.7 (A) 2015      Lab Results   Component Value Date    INR 1.1 2014    INR 1.1 2013    INR 1.3 (H) 2013           EK2017 AV dual paced rhythm     Assessment and Plan:     67 y/o male with  Kaity XT TAVR (Valve-in-Valve),  cardiomyopathy ischemic/valvular, ventricular tachycardia, VT ablation, and  CRT-D device in place     DFT/ NIEPS  Sedation per anesthesia.      Consent signed   The patient voices understanding and all questions have been answered. No further questions/concerns voiced at this time.      Signed:  SVEN Anton-BC  Cardiology Electrophysiology  NP   [facility]    Attending: MD Paddy Josue

## 2017-03-23 NOTE — ANESTHESIA PREPROCEDURE EVALUATION
03/23/2017  Michael Plummer is a 66 y.o., male.    [facility] Anesthesia Evaluation    I have reviewed the Patient Summary Reports.        Review of Systems  Anesthesia Hx:  No problems with previous Anesthesia    Social:  Non-Smoker    Hematology/Oncology:  Hematology Normal   Oncology Normal     EENT/Dental:EENT/Dental Normal   Cardiovascular:   Hypertension CAD   CHF (EF 30%, diastolic dysfunction)    Pulmonary:   COPD, moderate    Renal/:  Renal/ Normal     Hepatic/GI:  Hepatic/GI Normal    Musculoskeletal:  Musculoskeletal Normal    Neurological:  Neurology Normal    Endocrine:  Endocrine Normal    Dermatological:  Skin Normal    Psych:  Psychiatric Normal           Physical Exam  General:  Well nourished    Airway/Jaw/Neck:  Airway Findings: Mouth Opening: Normal Tongue: Normal  General Airway Assessment: Adult  Mallampati: II  Improves to II with phonation.  TM Distance: Normal, at least 6 cm  Jaw/Neck Findings:  Neck ROM: Normal ROM      Dental:  Dental Findings: In tact   Chest/Lungs:  Chest/Lungs Findings: Clear to auscultation, Normal Respiratory Rate     Heart/Vascular:  Heart Findings: Rate: Normal  Rhythm: Regular Rhythm  Sounds: Normal             Anesthesia Plan  Type of Anesthesia, risks & benefits discussed:  Anesthesia Type:  general  Patient's Preference: General  Intra-op Monitoring Plan:   Intra-op Monitoring Plan Comments:   Post Op Pain Control Plan:   Post Op Pain Control Plan Comments:   Induction:   IV  Beta Blocker:  Patient is on a Beta-Blocker and has received one dose within the past 24 hours (No further documentation required).       Informed Consent: Patient understands risks and agrees with Anesthesia plan.  Questions answered. Anesthesia consent signed with patient.  ASA Score: 4     Day of Surgery Review of History & Physical: I have interviewed and examined the patient. I have  reviewed the patient's H&P dated:  There are no significant changes.          Ready For Surgery From Anesthesia Perspective.

## 2017-03-23 NOTE — PLAN OF CARE
Problem: Patient Care Overview  Goal: Plan of Care Review  Outcome: Ongoing (interventions implemented as appropriate)  Admit assessment completed. IV placed x 1.  Plan of care initiated.  Will continue to monitor.

## 2017-04-13 ENCOUNTER — CLINICAL SUPPORT (OUTPATIENT)
Dept: ELECTROPHYSIOLOGY | Facility: CLINIC | Age: 67
End: 2017-04-13
Payer: MEDICARE

## 2017-04-13 DIAGNOSIS — Z95.810 PRESENCE OF AUTOMATIC CARDIOVERTER/DEFIBRILLATOR (AICD): ICD-10-CM

## 2017-04-13 DIAGNOSIS — I47.20 VENTRICULAR TACHYCARDIA: ICD-10-CM

## 2017-04-13 PROCEDURE — 93295 DEV INTERROG REMOTE 1/2/MLT: CPT | Mod: ,,, | Performed by: INTERNAL MEDICINE

## 2017-04-13 PROCEDURE — 93296 REM INTERROG EVL PM/IDS: CPT | Mod: PBBFAC | Performed by: INTERNAL MEDICINE

## 2017-06-19 ENCOUNTER — TELEPHONE (OUTPATIENT)
Dept: ELECTROPHYSIOLOGY | Facility: CLINIC | Age: 67
End: 2017-06-19

## 2017-06-19 ENCOUNTER — HOSPITAL ENCOUNTER (OUTPATIENT)
Dept: CARDIOLOGY | Facility: CLINIC | Age: 67
Discharge: HOME | End: 2017-06-19
Payer: MEDICARE

## 2017-06-19 ENCOUNTER — OFFICE VISIT (OUTPATIENT)
Dept: ELECTROPHYSIOLOGY | Facility: CLINIC | Age: 67
End: 2017-06-19
Payer: MEDICARE

## 2017-06-19 ENCOUNTER — CLINICAL SUPPORT (OUTPATIENT)
Dept: ELECTROPHYSIOLOGY | Facility: CLINIC | Age: 67
End: 2017-06-19
Payer: MEDICARE

## 2017-06-19 VITALS
BODY MASS INDEX: 39.17 KG/M2 | DIASTOLIC BLOOD PRESSURE: 82 MMHG | HEART RATE: 60 BPM | SYSTOLIC BLOOD PRESSURE: 124 MMHG | HEIGHT: 75 IN | WEIGHT: 315 LBS

## 2017-06-19 DIAGNOSIS — I47.20 VENTRICULAR TACHYARRHYTHMIA: ICD-10-CM

## 2017-06-19 DIAGNOSIS — E66.01 MORBID OBESITY WITH BMI OF 45.0-49.9, ADULT: ICD-10-CM

## 2017-06-19 DIAGNOSIS — I10 ESSENTIAL HYPERTENSION: ICD-10-CM

## 2017-06-19 DIAGNOSIS — I47.20 VENTRICULAR TACHYCARDIA: ICD-10-CM

## 2017-06-19 DIAGNOSIS — I47.20 VT (VENTRICULAR TACHYCARDIA): ICD-10-CM

## 2017-06-19 DIAGNOSIS — Z95.810 BIVENTRICULAR IMPLANTABLE CARDIOVERTER-DEFIBRILLATOR (ICD) IN SITU: ICD-10-CM

## 2017-06-19 DIAGNOSIS — I35.0 NONRHEUMATIC AORTIC VALVE STENOSIS: ICD-10-CM

## 2017-06-19 DIAGNOSIS — I42.9 CARDIOMYOPATHY OF UNDETERMINED TYPE: Primary | ICD-10-CM

## 2017-06-19 DIAGNOSIS — E78.5 HYPERLIPIDEMIA, UNSPECIFIED HYPERLIPIDEMIA TYPE: ICD-10-CM

## 2017-06-19 DIAGNOSIS — Z95.810 AUTOMATIC IMPLANTABLE CARDIAC DEFIBRILLATOR IN SITU: ICD-10-CM

## 2017-06-19 DIAGNOSIS — I25.10 CORONARY ARTERY DISEASE INVOLVING NATIVE CORONARY ARTERY OF NATIVE HEART WITHOUT ANGINA PECTORIS: ICD-10-CM

## 2017-06-19 DIAGNOSIS — Z95.2 S/P TAVR (TRANSCATHETER AORTIC VALVE REPLACEMENT): ICD-10-CM

## 2017-06-19 PROCEDURE — 1159F MED LIST DOCD IN RCRD: CPT | Mod: ,,, | Performed by: INTERNAL MEDICINE

## 2017-06-19 PROCEDURE — 1126F AMNT PAIN NOTED NONE PRSNT: CPT | Mod: ,,, | Performed by: INTERNAL MEDICINE

## 2017-06-19 PROCEDURE — 99999 PR PBB SHADOW E&M-EST. PATIENT-LVL III: CPT | Mod: PBBFAC,,, | Performed by: INTERNAL MEDICINE

## 2017-06-19 PROCEDURE — 99214 OFFICE O/P EST MOD 30 MIN: CPT | Mod: S$PBB,,, | Performed by: INTERNAL MEDICINE

## 2017-06-19 PROCEDURE — 93005 ELECTROCARDIOGRAM TRACING: CPT | Mod: ,,, | Performed by: INTERNAL MEDICINE

## 2017-06-19 PROCEDURE — 93010 ELECTROCARDIOGRAM REPORT: CPT | Mod: 77,S$PBB,, | Performed by: INTERNAL MEDICINE

## 2017-06-19 PROCEDURE — 93284 PRGRMG EVAL IMPLANTABLE DFB: CPT | Mod: PBBFAC | Performed by: INTERNAL MEDICINE

## 2017-06-19 PROCEDURE — 1157F ADVNC CARE PLAN IN RCRD: CPT | Mod: ,,, | Performed by: INTERNAL MEDICINE

## 2017-06-19 PROCEDURE — 93010 ELECTROCARDIOGRAM REPORT: CPT | Mod: ,,, | Performed by: INTERNAL MEDICINE

## 2017-06-19 NOTE — TELEPHONE ENCOUNTER
Left voicemail notifying patient that his next device follow-up will be via remote monitor in September.

## 2017-06-19 NOTE — PROGRESS NOTES
Subjective:   \[facility]\""     Cardiologist: Talya Rushing MD    I had the pleasure of seeing Michael Plummer in follow-up today for his history of ventricular tachycardia, VT ablation, and upgrade to CRT-D device. He is a 65-year old male with a history of mixed cardiomyopathy (ischemic and valvular per outside records), CAD, aortic stenosis status-post AVR in 2005, and dual chamber Medtronic ICD in situ (placed by Dr. Trimble in [place], generator changed last year), who suffered traumatic syncope in September of 2012. His device interrogation at the time revealed MMVT (-290ms); not terminated with ATP, or his initial 35J shock A second 35J shock restored SR. Prodromal symptoms noted prior to the event included dyspnea, palpitations, and dizziness. [place] [facility] [place] and subarachnoid hemorrhage. While in the hospital, he was seen in consultation by the electrophysiology service, at which time he was re-loaded with Amiodarone and his daily dose increased to 400 mg daily (he had been on a lower dose of Amiodarone since 2005). The patient had no further episodes of VT while in-house. In October of 2012 however, Mr. Plummer experienced another episode of VT (-440 ms) with similar morphology to the original VT, which was successfully treated with a 35J shock. Prior to this episode, he experienced again experienced palpitations and lightedness, and was able to sit down to prevent injury.     Unfortunately, Mr. Plummer developed worsening PFTs on Amiodarone and it was discontinued. Mr. Plummer subsequently underwent a VT ablation (07/25/13) [place] [facility] Post-procedure, he was started on Tikosyn 500 ug BID. Lexapro was stopped, and Prozac was initiated. Prozac was eventually stopped due to symptoms of fatigue, dry mouth, and insomnia.     In late 2013, Mr. Plummer underwent TAVR. After experiencing several additional episodes of VT, he underwent a successful CRT-D (01/13/14) without complication. At that  "time, Tikosyn was stopped and Sotalol was initiated. At his [place] [facility] [place], Mr. Plummer reported experiencing a marked improvement in his energy level s/p BiV upgrade; he described the difference in his energy level from that pre-procedure to that post-procedure: as different as "night and day." Following the procedure, he was back to riding his tractor regularly, and walking on a treadmill without difficulty. At his subsequent office visits (September of 2014 and June of 2016; respectively) Mr. Plummer continued to feel "great." He continued to walk and work on his property regularly; he reported being only limited by occasional balance issues and bilateral knee pain. By the time of his 2016 office visit, he had begun to walk with the assistance of a walker.    Since his last office visit, Mr. Plummer reports experiencing a slight increase in his level of fatigue from last year, as well as stable GALINDO. He denies chest pain, overt SOB dizziness, palpitations, or syncope. He states that he sleeps well and that he is compliant with his CPAP machine. He is currently in a motorized chair and remains limited by orthopedic pain.     Recent cardiac studies:  Echo (09/07/16) revealed an EF of 30-35% (stable over the last 3+ years), biatrial enlargement (several LAE; SEBASTIÁN measuring 67.88 cc/m2), LVDD, RVE w/normal systolic function, s/p transcatheter AVR (mean gradient of 19mmHg with mild perivalvular AI), mild-to-moderate MR, trivial TR, and a PASP>20 mmHg.   Remote Device Transmission (04/13/17) reveals stable lead and device function. AMS x 6 (0% AT/AF burden; max episode duration 1 minute, 16 seconds; egrams c/w oversensing); no NSVT; 3.8% PVC burden noted. He RA paces 70% and BiV paces 95% of the time. Estimated battery longevity 3-4 years.     I reviewed today's ECG which demonstrated a BiV-paced rhythm at 60 bpm; , and QTc 496.    Review of Systems   Constitution: Negative for decreased appetite, " malaise/fatigue, weight gain and weight loss.   HENT: Negative for sore throat.    Eyes: Negative for blurred vision.   Cardiovascular: Negative for chest pain, dyspnea on exertion, irregular heartbeat, leg swelling, near-syncope, orthopnea, palpitations, paroxysmal nocturnal dyspnea and syncope.   Respiratory: Negative for shortness of breath.    Skin: Negative for rash.   Musculoskeletal: Negative for arthritis.   Gastrointestinal: Negative for abdominal pain.   Neurological: Negative for difficulty with concentration, excessive daytime sleepiness and focal weakness.   Psychiatric/Behavioral: Negative for altered mental status.        Objective:    Physical Exam   Constitutional: He is oriented to person, place, and time. He appears well-developed and well-nourished. No distress.   HENT:   Head: Normocephalic and atraumatic.   Mouth/Throat: Oropharynx is clear and moist.   Eyes: Pupils are equal, round, and reactive to light. No scleral icterus.   Neck: Neck supple. No JVD present.   Cardiovascular: Regular rhythm and normal pulses.  Exam reveals no gallop and no friction rub.    Murmur heard.   Harsh midsystolic murmur is present with a grade of 2/6  at the upper right sternal border radiating to the neck  Pulmonary/Chest: Effort normal and breath sounds normal.   Abdominal: Soft. Bowel sounds are normal. He exhibits no distension. There is no tenderness.   Musculoskeletal: He exhibits no edema.   Neurological: He is alert and oriented to person, place, and time.   Skin: Skin is warm and dry. No rash noted.   Psychiatric: He has a normal mood and affect. His behavior is normal.         Assessment:       1. Cardiomyopathy of undetermined type    2. Ventricular tachycardia    3. Biventricular implantable cardioverter-defibrillator (ICD) in situ    4. Nonrheumatic aortic valve stenosis    5. S/P TAVR (transcatheter aortic valve replacement)    6. Coronary artery disease involving native coronary artery of native  heart without angina pectoris    7. Hyperlipidemia, unspecified hyperlipidemia type    8. Essential hypertension    9. 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 He has been well overall, but has noted a marked increase in his fatigue over the last year; . Mr. Plummer is doing well from a rhythm perspective with stable lead and device function, 95% BiV pacing, and no arrhythmia noted; rhythm-controlled (VT) on Sotalol.    Continue current medication regimen; turn on MPP given increased fatigue in the setting of a wide-paced QRS.   Follow up in device clinic as scheduled.   Follow up in EP clinic in 1 year, sooner as needed.     Deborah Gomez, MN, APRN, FNP-C    EP ATTENDING ADDENDUM:    I have personally seen, taken the history and examined the patient. I agree with the NP whose note reflects my findings and plan.    Plan is for V-V optimization today, and to also evaluate MPP. 12-lead ECGs will determine which pacing strategy to adopt.    Paddy Josue MD

## 2017-06-19 NOTE — TELEPHONE ENCOUNTER
----- Message from Allan Braun MA sent at 6/19/2017  1:13 PM CDT -----  Contact: patient called   Pt is wanting to know if he still needs to come to the clinic for the scheduled appt in July or can it just be checked from his remote for that visit?  ----- Message -----  From: Mali Alexander MA  Sent: 6/19/2017  12:40 PM  To: [facility] Thom[facility] Staff    Please call the patient at home he would like to talk to you about his visit with . Thank you.

## 2017-06-20 DIAGNOSIS — I47.20 VENTRICULAR TACHYARRHYTHMIA: Primary | ICD-10-CM

## 2017-06-27 DIAGNOSIS — Z00.6 EXAMINATION OF PARTICIPANT IN CLINICAL TRIAL: ICD-10-CM

## 2017-06-27 DIAGNOSIS — I35.0 NONRHEUMATIC AORTIC VALVE STENOSIS: Primary | ICD-10-CM

## 2017-08-08 DIAGNOSIS — I47.20 VT (VENTRICULAR TACHYCARDIA): ICD-10-CM

## 2017-08-08 RX ORDER — SOTALOL HYDROCHLORIDE 80 MG/1
80 TABLET ORAL 2 TIMES DAILY
Qty: 180 TABLET | Refills: 3 | Status: SHIPPED | OUTPATIENT
Start: 2017-08-08 | End: 2018-08-09 | Stop reason: SDUPTHER

## 2017-09-12 ENCOUNTER — DOCUMENTATION ONLY (OUTPATIENT)
Dept: CARDIOLOGY | Facility: CLINIC | Age: 67
End: 2017-09-12

## 2017-09-12 ENCOUNTER — HOSPITAL ENCOUNTER (OUTPATIENT)
Dept: CARDIOLOGY | Facility: CLINIC | Age: 67
Discharge: HOME | End: 2017-09-12
Payer: MEDICARE

## 2017-09-12 ENCOUNTER — OFFICE VISIT (OUTPATIENT)
Dept: CARDIOLOGY | Facility: CLINIC | Age: 67
End: 2017-09-12
Payer: MEDICARE

## 2017-09-12 ENCOUNTER — HOSPITAL ENCOUNTER (OUTPATIENT)
Dept: RADIOLOGY | Facility: HOSPITAL | Age: 67
Discharge: HOME | End: 2017-09-12
Attending: INTERNAL MEDICINE
Payer: MEDICARE

## 2017-09-12 VITALS
HEART RATE: 60 BPM | BODY MASS INDEX: 39.17 KG/M2 | OXYGEN SATURATION: 98 % | SYSTOLIC BLOOD PRESSURE: 177 MMHG | DIASTOLIC BLOOD PRESSURE: 82 MMHG | HEIGHT: 75 IN | WEIGHT: 315 LBS

## 2017-09-12 DIAGNOSIS — E66.01 MORBID OBESITY WITH BMI OF 45.0-49.9, ADULT: ICD-10-CM

## 2017-09-12 DIAGNOSIS — Z45.02 ICD (IMPLANTABLE CARDIOVERTER-DEFIBRILLATOR) DISCHARGE: ICD-10-CM

## 2017-09-12 DIAGNOSIS — I10 ESSENTIAL HYPERTENSION: ICD-10-CM

## 2017-09-12 DIAGNOSIS — I35.0 NONRHEUMATIC AORTIC VALVE STENOSIS: ICD-10-CM

## 2017-09-12 DIAGNOSIS — Z95.810 BIVENTRICULAR IMPLANTABLE CARDIOVERTER-DEFIBRILLATOR (ICD) IN SITU: ICD-10-CM

## 2017-09-12 DIAGNOSIS — E78.5 HYPERLIPIDEMIA, UNSPECIFIED HYPERLIPIDEMIA TYPE: ICD-10-CM

## 2017-09-12 DIAGNOSIS — Z95.2 S/P TAVR (TRANSCATHETER AORTIC VALVE REPLACEMENT): ICD-10-CM

## 2017-09-12 DIAGNOSIS — Z00.6 EXAMINATION OF PARTICIPANT IN CLINICAL TRIAL: ICD-10-CM

## 2017-09-12 DIAGNOSIS — I25.10 CORONARY ARTERY DISEASE INVOLVING NATIVE CORONARY ARTERY OF NATIVE HEART WITHOUT ANGINA PECTORIS: Primary | ICD-10-CM

## 2017-09-12 LAB
AORTIC VALVE REGURGITATION: ABNORMAL
DIASTOLIC DYSFUNCTION: YES
ESTIMATED PA SYSTOLIC PRESSURE: 33.69
MITRAL VALVE REGURGITATION: ABNORMAL
RETIRED EF AND QEF - SEE NOTES: 20 (ref 55–65)
TRICUSPID VALVE REGURGITATION: ABNORMAL

## 2017-09-12 PROCEDURE — 93306 TTE W/DOPPLER COMPLETE: CPT | Mod: 26,Q1,S$PBB, | Performed by: INTERNAL MEDICINE

## 2017-09-12 PROCEDURE — 99213 OFFICE O/P EST LOW 20 MIN: CPT | Mod: PBBFAC,25

## 2017-09-12 PROCEDURE — 3079F DIAST BP 80-89 MM HG: CPT | Mod: Q1,,, | Performed by: NURSE PRACTITIONER

## 2017-09-12 PROCEDURE — C8929 TTE W OR WO FOL WCON,DOPPLER: HCPCS | Mod: PBBFAC | Performed by: INTERNAL MEDICINE

## 2017-09-12 PROCEDURE — 3077F SYST BP >= 140 MM HG: CPT | Mod: Q1,,, | Performed by: NURSE PRACTITIONER

## 2017-09-12 PROCEDURE — 71020 XR CHEST PA AND LATERAL: CPT | Mod: TC

## 2017-09-12 PROCEDURE — 99214 OFFICE O/P EST MOD 30 MIN: CPT | Mod: Q1,S$PBB,, | Performed by: NURSE PRACTITIONER

## 2017-09-12 PROCEDURE — 1157F ADVNC CARE PLAN IN RCRD: CPT | Mod: Q1,,, | Performed by: NURSE PRACTITIONER

## 2017-09-12 PROCEDURE — 99999 PR PBB SHADOW E&M-EST. PATIENT-LVL III: CPT | Mod: PBBFAC,,,

## 2017-09-12 PROCEDURE — 1159F MED LIST DOCD IN RCRD: CPT | Mod: Q1,,, | Performed by: NURSE PRACTITIONER

## 2017-09-12 PROCEDURE — 71020 XR CHEST PA AND LATERAL: CPT | Mod: 26,Q1,, | Performed by: RADIOLOGY

## 2017-09-12 PROCEDURE — 1125F AMNT PAIN NOTED PAIN PRSNT: CPT | Mod: Q1,,, | Performed by: NURSE PRACTITIONER

## 2017-09-12 RX ORDER — DEXLANSOPRAZOLE 60 MG/1
1 CAPSULE, DELAYED RELEASE ORAL DAILY
Refills: 12 | COMMUNITY
Start: 2017-07-12

## 2017-09-12 RX ORDER — ASPIRIN 325 MG
100 TABLET, DELAYED RELEASE (ENTERIC COATED) ORAL DAILY
COMMUNITY

## 2017-09-12 RX ORDER — ROSUVASTATIN CALCIUM 40 MG/1
TABLET, COATED ORAL
Refills: 7 | COMMUNITY
Start: 2017-07-07

## 2017-09-12 NOTE — PROGRESS NOTES
Subjective:    Patient ID:  Michael Plummer is a 67 y.o. male who presents for 4 yr follow-up of TAVR.    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  Pt walks around the house with walker but states he often only uses his walker to go from his wheelchair to his recliner. He states he has had SOB when ambulating which occurred over the past couple of years. He also has gained 25 lbs over the past year per his recollection and attributed his worsened SOB to his weight gain. He denies CP, PND, orthopnea, or worsening LE edema.      NYHA Class II, CCS Class I      Review of Systems   Constitution: Negative for chills, diaphoresis, fever, weakness, weight gain and weight loss.   HENT: Negative for sore throat.    Eyes: Negative for blurred vision, vision loss in left eye, vision loss in right eye and visual disturbance.   Cardiovascular: Negative for chest pain, claudication, dyspnea on exertion, leg swelling, near-syncope, orthopnea, palpitations, paroxysmal nocturnal dyspnea and syncope.   Respiratory: Negative for cough, hemoptysis, shortness of breath, sputum production and wheezing.    Endocrine: Negative for cold intolerance and heat intolerance.   Hematologic/Lymphatic: Negative for adenopathy. Does not bruise/bleed easily.   Skin: Negative for rash.   Musculoskeletal: Positive for joint pain (R Knee). Negative for falls, muscle weakness and myalgias.   Gastrointestinal: Negative for abdominal pain, change in bowel habit, constipation, diarrhea, melena and nausea.   Genitourinary: Negative for bladder incontinence.   Neurological: Positive for loss of balance. Negative for dizziness, focal weakness, headaches, light-headedness and numbness.   Psychiatric/Behavioral: Negative for altered mental status.        There were no vitals filed for this visit.There is no height or weight on file to calculate BMI.    Objective:    Physical Exam    Constitutional: He is oriented to person, place, and time. He appears well-developed and well-nourished.   HENT:   Head: Normocephalic and atraumatic.   Eyes: EOM are normal. Pupils are equal, round, and reactive to light.   Neck: Neck supple. No JVD present. No tracheal deviation present. No thyromegaly present.   Cardiovascular: Normal rate, regular rhythm, S1 normal, S2 normal, intact distal pulses and normal pulses.  PMI is not displaced.  Exam reveals no gallop and no friction rub.    Murmur heard.  Pulmonary/Chest: Effort normal and breath sounds normal. No respiratory distress. He has no wheezes. He has no rales. He exhibits no tenderness.   Abdominal: Soft. Bowel sounds are normal. He exhibits no distension and no mass. There is no hepatosplenomegaly. There is no tenderness.   Musculoskeletal: Normal range of motion. He exhibits no edema or tenderness.   Neurological: He is alert and oriented to person, place, and time.   Skin: Skin is warm and dry. No rash noted.   Psychiatric: He has a normal mood and affect. His behavior is normal.         Assessment:       1. Aortic valve disorders - s/p 26mm Kaity XT TAVR (Valve-in-Valve) for failing bioprosthetic AVR.    2. Coronary artery disease - s/p CABG; s/p LM VENKAT, RCA VENKAT, OM1 VENKAT, and SVG to OM2 VENKAT on 7/30/13. Asymptomatic.    3. HTN (hypertension) - controlled on amlodipine, lisinopril, Toprol XL    4. HLD (hyperlipidemia) - on statin   5. ICD (implantable cardiac defibrillator) in place - recently upgraded to CRT-D, follows with Dr. Josue   6. Morbid obesity - BMI = 43        Plan:     Echo pending - will call patient with results   Educated pt to increase ambulation and exercise as tolerated and encourage weight loss   Continue ASA indefinitely   SBEP for life.   F/U in 1 year per [facility] protocol for 5 Y f/u

## 2017-09-12 NOTE — PROGRESS NOTES
Patient identified by 2 identifiers. Denies previous reactions to blood transfusions and allergies reviewed.  Procedure explained & consent obtained.  24 g IV placed to Rt Hand, flushed w/ 10cc NS pre & post contrast administration.  3cc Optison administered, echo images obtained.  Pt tolerated procedure well.  IV D/C'ed, preasure dsg applied.  Pt D/C'ed to home.

## 2017-09-19 ENCOUNTER — CLINICAL SUPPORT (OUTPATIENT)
Dept: ELECTROPHYSIOLOGY | Facility: CLINIC | Age: 67
End: 2017-09-19
Payer: MEDICARE

## 2017-09-19 DIAGNOSIS — Z95.810 PRESENCE OF AUTOMATIC CARDIOVERTER/DEFIBRILLATOR (AICD): ICD-10-CM

## 2017-09-19 DIAGNOSIS — I47.20 VENTRICULAR TACHYCARDIA: ICD-10-CM

## 2017-09-19 PROCEDURE — 93296 REM INTERROG EVL PM/IDS: CPT | Mod: PBBFAC | Performed by: INTERNAL MEDICINE

## 2017-09-19 PROCEDURE — 93295 DEV INTERROG REMOTE 1/2/MLT: CPT | Mod: ,,, | Performed by: INTERNAL MEDICINE

## 2017-10-07 DIAGNOSIS — I42.9 CHF WITH CARDIOMYOPATHY: ICD-10-CM

## 2017-10-07 DIAGNOSIS — Z95.810 AICD (AUTOMATIC CARDIOVERTER/DEFIBRILLATOR) PRESENT: Primary | ICD-10-CM

## 2017-10-07 DIAGNOSIS — I50.9 CHF WITH CARDIOMYOPATHY: ICD-10-CM

## 2017-12-20 ENCOUNTER — CLINICAL SUPPORT (OUTPATIENT)
Dept: ELECTROPHYSIOLOGY | Facility: CLINIC | Age: 67
End: 2017-12-20
Payer: MEDICARE

## 2017-12-20 DIAGNOSIS — Z95.810 AICD (AUTOMATIC CARDIOVERTER/DEFIBRILLATOR) PRESENT: ICD-10-CM

## 2017-12-20 DIAGNOSIS — I50.9 CHF WITH CARDIOMYOPATHY: ICD-10-CM

## 2017-12-20 DIAGNOSIS — I42.9 CHF WITH CARDIOMYOPATHY: ICD-10-CM

## 2017-12-20 PROCEDURE — 93296 REM INTERROG EVL PM/IDS: CPT | Mod: PBBFAC | Performed by: INTERNAL MEDICINE

## 2017-12-20 PROCEDURE — 93295 DEV INTERROG REMOTE 1/2/MLT: CPT | Mod: ,,, | Performed by: INTERNAL MEDICINE

## 2018-02-15 ENCOUNTER — TELEPHONE (OUTPATIENT)
Dept: CARDIOLOGY | Facility: CLINIC | Age: 68
End: 2018-02-15

## 2018-02-15 NOTE — TELEPHONE ENCOUNTER
Patient was referred by Dr Bell for possible LHC by Dr Morales after an abnormal PET stress with SOB.  Records scanned into media and offered patient an appointment with Dr Morales but his  had shoulder surgery and he will call back when he is able to get a ride.

## 2018-03-26 ENCOUNTER — CLINICAL SUPPORT (OUTPATIENT)
Dept: ELECTROPHYSIOLOGY | Facility: CLINIC | Age: 68
End: 2018-03-26
Payer: MEDICARE

## 2018-03-26 DIAGNOSIS — Z95.810 AICD (AUTOMATIC CARDIOVERTER/DEFIBRILLATOR) PRESENT: ICD-10-CM

## 2018-03-26 DIAGNOSIS — I50.9 CHF WITH CARDIOMYOPATHY: ICD-10-CM

## 2018-03-26 DIAGNOSIS — I42.9 CHF WITH CARDIOMYOPATHY: ICD-10-CM

## 2018-03-26 PROCEDURE — 93296 REM INTERROG EVL PM/IDS: CPT | Mod: PBBFAC | Performed by: INTERNAL MEDICINE

## 2018-03-26 PROCEDURE — 93295 DEV INTERROG REMOTE 1/2/MLT: CPT | Mod: ,,, | Performed by: INTERNAL MEDICINE

## 2018-04-25 ENCOUNTER — DOCUMENTATION ONLY (OUTPATIENT)
Dept: ELECTROPHYSIOLOGY | Facility: CLINIC | Age: 68
End: 2018-04-25

## 2018-04-25 NOTE — PROGRESS NOTES
"  Patient with Non-Sustained RV oversensing episodes on Merlin 3/29/18 and 4/15/18.  [employer] support was contacted and this was their recommendation:    "It seems that we are sensing crosstalk from the atrial paces intermittently. The oversensed amplitudes are 0.95 mV so I dont think we will want to make the device even less sensitive. We could consider decoupling the pacer and ICD sensitivity and bringing the pacer sensitivity up to above 1.0 mV. This will prevent the inhibition of pacing. It does seem that the behavior is very intermittent, only happening for a few minutes on a few different days so decoupling may be our best bet as it will prevent inhibited paces if this does occur again."    Reviewed with Dr. Josue and he is okay with the recommended reprogramming changes being made.  Next scheduled ICD check is 6/25/18.      "

## 2018-05-21 DIAGNOSIS — Z00.6 EXAMINATION OF PARTICIPANT IN CLINICAL TRIAL: ICD-10-CM

## 2018-05-21 DIAGNOSIS — Z95.2 S/P TAVR (TRANSCATHETER AORTIC VALVE REPLACEMENT): Primary | ICD-10-CM

## 2018-07-19 ENCOUNTER — TELEPHONE (OUTPATIENT)
Dept: ELECTROPHYSIOLOGY | Facility: CLINIC | Age: 68
End: 2018-07-19

## 2018-07-19 ENCOUNTER — CLINICAL SUPPORT (OUTPATIENT)
Dept: ELECTROPHYSIOLOGY | Facility: CLINIC | Age: 68
End: 2018-07-19
Attending: INTERNAL MEDICINE
Payer: MEDICARE

## 2018-07-19 DIAGNOSIS — I50.9 CHF WITH CARDIOMYOPATHY: ICD-10-CM

## 2018-07-19 DIAGNOSIS — Z95.810 AICD (AUTOMATIC CARDIOVERTER/DEFIBRILLATOR) PRESENT: ICD-10-CM

## 2018-07-19 DIAGNOSIS — I42.9 CHF WITH CARDIOMYOPATHY: ICD-10-CM

## 2018-07-19 PROCEDURE — 93295 DEV INTERROG REMOTE 1/2/MLT: CPT | Mod: ,,, | Performed by: INTERNAL MEDICINE

## 2018-07-19 PROCEDURE — 93296 REM INTERROG EVL PM/IDS: CPT | Mod: PBBFAC | Performed by: INTERNAL MEDICINE

## 2018-07-19 NOTE — TELEPHONE ENCOUNTER
----- Message from Nieves Encarnacion RN sent at 7/17/2018 11:13 AM CDT -----  Contact: Patient      ----- Message -----  From: Norah Dowd  Sent: 7/17/2018  10:45 AM  To: [facility] Arrhythmia Device Staff    The Pt would like to know if he needs a device check this month? Please call him back @ [phone]. Thanks, Norah

## 2018-07-19 NOTE — TELEPHONE ENCOUNTER
Returned the patient's call on this afternoon.  As it turns out he was calling to try coordinate all of this appointments on the same day.  Informed the patient his ICD and Dr. Josue appointments are not until October 1st.  Patient stated this was fine as is.  A manual remote transmission was done with the patient on today as well. All was WNL.  Patient verbalized understanding to all of the above.

## 2018-08-09 DIAGNOSIS — I47.20 VT (VENTRICULAR TACHYCARDIA): ICD-10-CM

## 2018-08-09 RX ORDER — SOTALOL HYDROCHLORIDE 80 MG/1
80 TABLET ORAL 2 TIMES DAILY
Qty: 180 TABLET | Refills: 0 | Status: SHIPPED | OUTPATIENT
Start: 2018-08-09 | End: 2018-11-05 | Stop reason: SDUPTHER

## 2018-08-15 ENCOUNTER — HOSPITAL ENCOUNTER (OUTPATIENT)
Dept: CARDIOLOGY | Facility: CLINIC | Age: 68
Discharge: HOME | End: 2018-08-15
Attending: INTERNAL MEDICINE
Payer: MEDICARE

## 2018-08-15 ENCOUNTER — HOSPITAL ENCOUNTER (OUTPATIENT)
Dept: RADIOLOGY | Facility: HOSPITAL | Age: 68
Discharge: HOME | End: 2018-08-15
Attending: INTERNAL MEDICINE
Payer: MEDICARE

## 2018-08-15 ENCOUNTER — OFFICE VISIT (OUTPATIENT)
Dept: CARDIOLOGY | Facility: CLINIC | Age: 68
End: 2018-08-15
Payer: MEDICARE

## 2018-08-15 ENCOUNTER — DOCUMENTATION ONLY (OUTPATIENT)
Dept: CARDIOLOGY | Facility: CLINIC | Age: 68
End: 2018-08-15

## 2018-08-15 VITALS
BODY MASS INDEX: 39.17 KG/M2 | DIASTOLIC BLOOD PRESSURE: 69 MMHG | SYSTOLIC BLOOD PRESSURE: 134 MMHG | HEART RATE: 60 BPM | OXYGEN SATURATION: 98 % | HEIGHT: 75 IN | WEIGHT: 315 LBS

## 2018-08-15 DIAGNOSIS — I25.10 CORONARY ARTERY DISEASE INVOLVING NATIVE CORONARY ARTERY OF NATIVE HEART WITHOUT ANGINA PECTORIS: Primary | ICD-10-CM

## 2018-08-15 DIAGNOSIS — Z00.6 EXAMINATION OF PARTICIPANT IN CLINICAL TRIAL: ICD-10-CM

## 2018-08-15 DIAGNOSIS — E66.01 MORBID OBESITY WITH BMI OF 45.0-49.9, ADULT: ICD-10-CM

## 2018-08-15 DIAGNOSIS — I10 ESSENTIAL HYPERTENSION: ICD-10-CM

## 2018-08-15 DIAGNOSIS — Z95.2 S/P TAVR (TRANSCATHETER AORTIC VALVE REPLACEMENT): ICD-10-CM

## 2018-08-15 DIAGNOSIS — I42.9 CARDIOMYOPATHY, UNSPECIFIED TYPE: ICD-10-CM

## 2018-08-15 DIAGNOSIS — Z95.810 BIVENTRICULAR IMPLANTABLE CARDIOVERTER-DEFIBRILLATOR (ICD) IN SITU: ICD-10-CM

## 2018-08-15 LAB
AORTIC VALVE REGURGITATION: ABNORMAL
DIASTOLIC DYSFUNCTION: YES
RETIRED EF AND QEF - SEE NOTES: 25 (ref 55–65)

## 2018-08-15 PROCEDURE — 71046 X-RAY EXAM CHEST 2 VIEWS: CPT | Mod: TC,FY

## 2018-08-15 PROCEDURE — 99999 PR PBB SHADOW E&M-EST. PATIENT-LVL V: CPT | Mod: PBBFAC,,, | Performed by: INTERNAL MEDICINE

## 2018-08-15 PROCEDURE — 99215 OFFICE O/P EST HI 40 MIN: CPT | Mod: PBBFAC,25,Q1 | Performed by: INTERNAL MEDICINE

## 2018-08-15 PROCEDURE — 99214 OFFICE O/P EST MOD 30 MIN: CPT | Mod: S$PBB,,, | Performed by: INTERNAL MEDICINE

## 2018-08-15 PROCEDURE — 93306 TTE W/DOPPLER COMPLETE: CPT | Mod: 26,S$PBB,, | Performed by: INTERNAL MEDICINE

## 2018-08-15 PROCEDURE — 71046 X-RAY EXAM CHEST 2 VIEWS: CPT | Mod: 26,,, | Performed by: RADIOLOGY

## 2018-08-15 NOTE — PROGRESS NOTES
Patient identified by 2 identifiers. Denies previous reactions to blood transfusions and allergies reviewed.  Procedure explained & consent obtained.  22 g IV placed to Rt hand, flushed w/ 10cc NS pre & post contrast administration.  1.5cc Definity administered, echo images obtained.  Pt tolerated procedure well.  IV D/C'ed, preasure dsg applied.  Pt D/C'ed to home.

## 2018-08-15 NOTE — PROGRESS NOTES
"Subjective:    Patient ID:  Michael Plummer is a 68 y.o. male who presents for  follow-up of TAVR.    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  He is without complaints today. He denies CP, PND, orthopnea, or worsening LE edema. He is still wheelchair dependent.     NYHA Class II, CCS Class I      Review of Systems   Constitution: Negative for chills, diaphoresis, fever, weakness, weight gain and weight loss.   HENT: Negative for sore throat.    Eyes: Negative for blurred vision, vision loss in left eye, vision loss in right eye and visual disturbance.   Cardiovascular: Negative for chest pain, claudication, dyspnea on exertion, leg swelling, near-syncope, orthopnea, palpitations, paroxysmal nocturnal dyspnea and syncope.   Respiratory: Negative for cough, hemoptysis, shortness of breath, sputum production and wheezing.    Endocrine: Negative for cold intolerance and heat intolerance.   Hematologic/Lymphatic: Negative for adenopathy. Does not bruise/bleed easily.   Skin: Negative for rash.   Musculoskeletal: Positive for joint pain (R Knee). Negative for falls, muscle weakness and myalgias.   Gastrointestinal: Negative for abdominal pain, change in bowel habit, constipation, diarrhea, melena and nausea.   Genitourinary: Negative for bladder incontinence.   Neurological: Positive for loss of balance. Negative for dizziness, focal weakness, headaches, light-headedness and numbness.   Psychiatric/Behavioral: Negative for altered mental status.        Vitals:    08/15/18 1220 08/15/18 1228   BP: 137/65 134/69   BP Location: Right arm Left arm   Patient Position: Sitting Sitting   BP Method: Large (Automatic) Large (Automatic)   Pulse: 60 60   SpO2: 98%    Weight: (!) 174.6 kg (385 lb)    Height: 6' 3" (1.905 m)    Body mass index is 48.12 kg/m².    Objective:    Physical Exam   Constitutional: He is oriented to person, place, and time. He " appears well-developed and well-nourished.   HENT:   Head: Normocephalic and atraumatic.   Eyes: EOM are normal. Pupils are equal, round, and reactive to light.   Neck: Neck supple. No JVD present. No tracheal deviation present. No thyromegaly present.   Cardiovascular: Normal rate, regular rhythm, S1 normal, S2 normal, intact distal pulses and normal pulses. PMI is not displaced. Exam reveals no gallop and no friction rub.   Murmur heard.  High-pitched blowing holosystolic murmur is present with a grade of 2/6 at the apex.  Pulmonary/Chest: Effort normal and breath sounds normal. No respiratory distress. He has no wheezes. He has no rales. He exhibits no tenderness.   Abdominal: Soft. Bowel sounds are normal. He exhibits no distension and no mass. There is no hepatosplenomegaly. There is no tenderness.   Musculoskeletal: Normal range of motion. He exhibits no edema or tenderness.   Neurological: He is alert and oriented to person, place, and time.   Skin: Skin is warm and dry. No rash noted.   Psychiatric: He has a normal mood and affect. His behavior is normal.         Assessment:       1. Aortic valve disorders - s/p 26mm Kaity XT TAVR (Valve-in-Valve) for failing bioprosthetic AVR.    2. Coronary artery disease - s/p CABG; s/p LM VENKAT, RCA VENKAT, OM1 VEKNAT, and SVG to OM2 VENKAT on 7/30/13. Asymptomatic.    3. HTN (hypertension) - controlled on amlodipine, lisinopril, Toprol XL    4. HLD (hyperlipidemia) - on statin   5. ICD (implantable cardiac defibrillator) in place - recently upgraded to CRT-D, follows with Dr. Josue   6. Morbid obesity - BMI = 43        Plan:     Continue ASA indefinitely   SBEP for life.   F/U with us prn.

## 2018-09-27 DIAGNOSIS — I47.20 VT (VENTRICULAR TACHYCARDIA): ICD-10-CM

## 2018-09-27 DIAGNOSIS — Z95.810 ICD (IMPLANTABLE CARDIOVERTER-DEFIBRILLATOR) IN PLACE: Primary | ICD-10-CM

## 2018-09-28 ENCOUNTER — TELEPHONE (OUTPATIENT)
Dept: ELECTROPHYSIOLOGY | Facility: CLINIC | Age: 68
End: 2018-09-28

## 2018-09-28 DIAGNOSIS — I42.9 CARDIOMYOPATHY, UNSPECIFIED TYPE: Primary | ICD-10-CM

## 2018-10-01 ENCOUNTER — HOSPITAL ENCOUNTER (OUTPATIENT)
Dept: CARDIOLOGY | Facility: CLINIC | Age: 68
Discharge: HOME | End: 2018-10-01
Payer: MEDICARE

## 2018-10-01 ENCOUNTER — OFFICE VISIT (OUTPATIENT)
Dept: ELECTROPHYSIOLOGY | Facility: CLINIC | Age: 68
End: 2018-10-01
Payer: MEDICARE

## 2018-10-01 ENCOUNTER — CLINICAL SUPPORT (OUTPATIENT)
Dept: ELECTROPHYSIOLOGY | Facility: CLINIC | Age: 68
End: 2018-10-01
Payer: MEDICARE

## 2018-10-01 VITALS
WEIGHT: 315 LBS | HEIGHT: 75 IN | HEART RATE: 68 BPM | BODY MASS INDEX: 39.17 KG/M2 | SYSTOLIC BLOOD PRESSURE: 111 MMHG | DIASTOLIC BLOOD PRESSURE: 67 MMHG

## 2018-10-01 DIAGNOSIS — Z95.2 S/P TAVR (TRANSCATHETER AORTIC VALVE REPLACEMENT): ICD-10-CM

## 2018-10-01 DIAGNOSIS — E78.2 MIXED HYPERLIPIDEMIA: ICD-10-CM

## 2018-10-01 DIAGNOSIS — I47.20 VT (VENTRICULAR TACHYCARDIA): ICD-10-CM

## 2018-10-01 DIAGNOSIS — Z95.810 BIVENTRICULAR IMPLANTABLE CARDIOVERTER-DEFIBRILLATOR (ICD) IN SITU: ICD-10-CM

## 2018-10-01 DIAGNOSIS — I42.9 CARDIOMYOPATHY, UNSPECIFIED TYPE: ICD-10-CM

## 2018-10-01 DIAGNOSIS — I47.20 VENTRICULAR TACHYCARDIA: Primary | ICD-10-CM

## 2018-10-01 DIAGNOSIS — Z95.810 ICD (IMPLANTABLE CARDIOVERTER-DEFIBRILLATOR) IN PLACE: ICD-10-CM

## 2018-10-01 DIAGNOSIS — I49.9 VENTRICULAR ARRHYTHMIA: ICD-10-CM

## 2018-10-01 DIAGNOSIS — I10 ESSENTIAL HYPERTENSION: ICD-10-CM

## 2018-10-01 PROCEDURE — 99214 OFFICE O/P EST MOD 30 MIN: CPT | Mod: S$PBB,,, | Performed by: INTERNAL MEDICINE

## 2018-10-01 PROCEDURE — 99215 OFFICE O/P EST HI 40 MIN: CPT | Mod: PBBFAC,25 | Performed by: INTERNAL MEDICINE

## 2018-10-01 PROCEDURE — 99999 PR PBB SHADOW E&M-EST. PATIENT-LVL V: CPT | Mod: PBBFAC,,, | Performed by: INTERNAL MEDICINE

## 2018-10-01 PROCEDURE — 93005 ELECTROCARDIOGRAM TRACING: CPT | Mod: PBBFAC,59 | Performed by: INTERNAL MEDICINE

## 2018-10-01 PROCEDURE — 93010 ELECTROCARDIOGRAM REPORT: CPT | Mod: S$PBB,,, | Performed by: INTERNAL MEDICINE

## 2018-10-01 PROCEDURE — 93284 PRGRMG EVAL IMPLANTABLE DFB: CPT | Mod: PBBFAC | Performed by: INTERNAL MEDICINE

## 2018-10-01 RX ORDER — ALBUTEROL SULFATE 90 UG/1
2 AEROSOL, METERED RESPIRATORY (INHALATION) EVERY 6 HOURS PRN
Refills: 6 | COMMUNITY
Start: 2018-08-25

## 2018-10-01 NOTE — PROGRESS NOTES
Subjective:   [facility]     Cardiologist: Talya Rushing MD    I had the pleasure of seeing Michael Plummer in follow-up today for his history of ventricular tachycardia, VT ablation, and upgrade to CRT-D device. He is a 68-year old male with a history of mixed cardiomyopathy (ischemic and valvular per outside records), CAD, aortic stenosis status-post bioprosthetic AVR in 2005 and TAVR in 2013, and dual chamber Medtronic ICD in situ (placed by Dr. Trimble in [place], generator changed in 1/2014), who suffered traumatic syncope in 9/2012. His device interrogation at the time revealed MMVT (-290ms); not terminated with ATP, or his initial 35J shock A second 35J shock restored SR. Prodromal symptoms noted prior to the event included dyspnea, palpitations, and dizziness. He was admitted to [facility] [place] for a subdural hematoma and subarachnoid hemorrhage. While in the hospital, he was seen in consultation by the electrophysiology service, at which time he was re-loaded with Amiodarone and his daily dose increased to 400 mg daily (he had been on a lower dose of Amiodarone since 2005). The patient had no further episodes of VT while in-house. In 10/2012 however, Mr. Plummer experienced another episode of VT (-440 ms) with similar morphology to the original VT, which was successfully treated with a 35J shock. Prior to this episode, he experienced again experienced palpitations and lightedness, and was able to sit down to prevent injury.     Unfortunately, Mr. Plummer developed worsening PFTs on Amiodarone and it was discontinued. Mr. Plummer underwent VT ablation in 7/2013. Post-procedure, he was started on Tikosyn 500 ug BID. Lexapro was stopped, and Prozac was initiated. Prozac was eventually stopped due to symptoms of fatigue, dry mouth, and insomnia.     In late 2013, Mr. Plummer underwent TAVR. After experiencing several additional episodes of VT, he underwent a successful CRT-D upgrade (01/13/14) without complication. At that  "time, Tikosyn was stopped and Sotalol was initiated. At his 4/2014 EP office visit, Mr. Plummer reported experiencing a marked improvement in his energy level following BiV upgrade; he described the difference in his energy level from that pre-procedure to that post-procedure: as different as "night and day." Following the procedure, he was back to riding his tractor regularly, and walking on a treadmill without difficulty. At his subsequent office visits 9/2014, 6/2016) Mr. Plummer continued to feel "great." He continued to walk and work on his property regularly; he reported being only limited by occasional balance issues and bilateral knee pain. By the time of his 2016 office visit, he had begun to walk with the assistance of a walker. Today in the office he is in an electric wheelchair.    Recent cardiac studies include an echo performed in 8/2018 which showed an EF of 25-30%.    I reviewed today's device interrogation, which shows stable device and lead function. He is biv paced 96% of the time. No atrial or ventricular arrhythmias have been seen. Estimated battery longevity 2 years.    My interpretation of today's ECG is sinus rhythm with biv pacing and occasional PVCs.    Review of Systems   Constitution: Negative for decreased appetite, malaise/fatigue, weight gain and weight loss.   HENT: Negative for sore throat.    Eyes: Negative for blurred vision.   Cardiovascular: Negative for chest pain, dyspnea on exertion, irregular heartbeat, leg swelling, near-syncope, orthopnea, palpitations, paroxysmal nocturnal dyspnea and syncope.   Respiratory: Negative for shortness of breath.    Skin: Negative for rash.   Musculoskeletal: Positive for falls. Negative for arthritis.   Gastrointestinal: Negative for abdominal pain.   Neurological: Positive for loss of balance. Negative for difficulty with concentration, excessive daytime sleepiness and focal weakness.   Psychiatric/Behavioral: Negative for altered mental " status.        Objective:    Physical Exam   Constitutional: He is oriented to person, place, and time. He appears well-developed and well-nourished. No distress.   HENT:   Head: Normocephalic and atraumatic.   Mouth/Throat: Oropharynx is clear and moist.   Eyes: Pupils are equal, round, and reactive to light. No scleral icterus.   Neck: Neck supple. No JVD present.   Cardiovascular: Regular rhythm and normal pulses. Exam reveals no gallop and no friction rub.   Murmur heard.   Harsh midsystolic murmur is present with a grade of 2/6 at the upper right sternal border radiating to the neck.  Pulmonary/Chest: Effort normal and breath sounds normal.   Abdominal: Soft. Bowel sounds are normal. He exhibits no distension. There is no tenderness.   Musculoskeletal: He exhibits no edema.   Neurological: He is alert and oriented to person, place, and time.   Skin: Skin is warm and dry. No rash noted.   Psychiatric: He has a normal mood and affect. His behavior is normal.         Assessment:       1. Ventricular tachycardia    2. Biventricular implantable cardioverter-defibrillator (ICD) in situ    3. Ventricular arrhythmia    4. S/P TAVR (transcatheter aortic valve replacement)    5. Essential hypertension    6. 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 He is currently on Sotalol 80 mg bid, with no ventricular arrhythmias since his ablation. The plan is to hold the course, continue with regular device checks, and see me again in 1 year.    Thank you for allowing me to participate in the care of this patient. Please do not hesitate to call me with any questions or concerns.

## 2018-11-05 DIAGNOSIS — I47.20 VT (VENTRICULAR TACHYCARDIA): ICD-10-CM

## 2018-11-05 RX ORDER — SOTALOL HYDROCHLORIDE 80 MG/1
TABLET ORAL
Qty: 180 TABLET | Refills: 0 | Status: SHIPPED | OUTPATIENT
Start: 2018-11-05